# Patient Record
Sex: MALE | Race: WHITE | Employment: STUDENT
[De-identification: names, ages, dates, MRNs, and addresses within clinical notes are randomized per-mention and may not be internally consistent; named-entity substitution may affect disease eponyms.]

---
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
## 2019-10-03 ENCOUNTER — HEALTH MAINTENANCE LETTER (OUTPATIENT)
Age: 19
End: 2019-10-03

## 2019-10-29 ENCOUNTER — TELEPHONE (OUTPATIENT)
Dept: CARDIOLOGY | Facility: CLINIC | Age: 19
End: 2019-10-29

## 2019-10-29 DIAGNOSIS — I37.1 PULMONARY VALVE INSUFFICIENCY: Primary | ICD-10-CM

## 2019-10-29 NOTE — TELEPHONE ENCOUNTER
[facility] Call Center    Phone Message    May a detailed message be left on voicemail: yes    Reason for Call: Other: patient mother scheduled appt for pt and would like to know if an ECHO would need to be done before appt. No consent on file to communicate with mother, writer advised mother that call back would be to pt and not her. please advise     Action Taken: Message routed to:  Pediatric Clinics: Cardiology p [id number]

## 2019-10-29 NOTE — TELEPHONE ENCOUNTER
DSTLD message was sent to patient directly. Provided ECHO/cardiology appointment information. Informed patient this RN could not give mother medical or appointment information as MARIUM was not on file at this time but can be completed at future appointment.  Zuri Batista RN

## 2019-11-06 NOTE — TELEPHONE ENCOUNTER
Called patient and left a VM regarding MyChart message and upcoming appointment.   Zuri Batista RN

## 2020-01-02 ENCOUNTER — OFFICE VISIT (OUTPATIENT)
Dept: CARDIOLOGY | Facility: CLINIC | Age: 20
End: 2020-01-02
Attending: PEDIATRICS
Payer: COMMERCIAL

## 2020-01-02 VITALS
BODY MASS INDEX: 27.11 KG/M2 | DIASTOLIC BLOOD PRESSURE: 108 MMHG | RESPIRATION RATE: 18 BRPM | SYSTOLIC BLOOD PRESSURE: 151 MMHG | HEIGHT: 68 IN | OXYGEN SATURATION: 97 % | HEART RATE: 97 BPM | WEIGHT: 178.9 LBS

## 2020-01-02 DIAGNOSIS — I37.1 NONRHEUMATIC PULMONARY VALVE INSUFFICIENCY: Primary | ICD-10-CM

## 2020-01-02 DIAGNOSIS — I37.1 PULMONARY VALVE INSUFFICIENCY: ICD-10-CM

## 2020-01-02 PROCEDURE — 99203 OFFICE O/P NEW LOW 30 MIN: CPT | Mod: 25 | Performed by: PEDIATRICS

## 2020-01-02 PROCEDURE — 93303 ECHO TRANSTHORACIC: CPT | Performed by: PEDIATRICS

## 2020-01-02 PROCEDURE — 93325 DOPPLER ECHO COLOR FLOW MAPG: CPT | Performed by: PEDIATRICS

## 2020-01-02 PROCEDURE — 93320 DOPPLER ECHO COMPLETE: CPT | Performed by: PEDIATRICS

## 2020-01-02 ASSESSMENT — MIFFLIN-ST. JEOR: SCORE: 1786.5

## 2020-01-02 ASSESSMENT — PAIN SCALES - GENERAL: PAINLEVEL: NO PAIN (0)

## 2020-01-02 NOTE — PROGRESS NOTES
"                                               PEDS Cardiac Consult Letter  Date: 2020      Olman Brownfield, MD  [facility]  [address] [place] [address]      PATIENT: Rashawn Bautista  :          2000   CARLOS:          2020    Dear Dr. Paiz:    Rashawn is 19 years old and was seen at the [facility] on 2020.   He underwent balloon dilation valvuloplasty for pulmonary stenosis on 1998.  Since that time he has been completely asymptomatic.  He is in his third year at [employer] and considering a degree in computer science.  He does not do regular aerobic exercise, but works at night and [email or web address].  He feels that his exercise tolerance is normal.  He has not experienced any syncope or palpitations.  A paternal uncle had a myocardial infarction at age 36, and his father at age 42.    On physical examination his height was 1.72 m (5' 7.72\") and his weight was 81.1 kg (178 lb 14.4 oz).  His heart rate was 97  and respirations 18 per minute.  The blood pressure in his right arm was 151/108.  He was acyanotic, warm and well perfused. He was alert cooperative and in no distress.  His lungs were clear to auscultation without respiratory distress.  He had a regular rhythm with a grade 1/6 to 2/6 early systolic murmur at the upper left sternal border.  The second heart sound was physiologically split with a normal pulmonary component.   There was no organomegaly or abdominal tenderness.  Peripheral pulses were 2+ and equal in all extremities.  There was no clubbing or edema.    An echocardiogram performed today that I personally reviewed and explained to him and his family showed a peak velocity of 1.7 m/s in the main pulmonary artery and 2+ pulmonary insufficiency.  There was no significant right ventricular enlargement.    Rashawn has a good hemodynamic result from balloon dilation for pulmonary valve stenosis.  Because of the " residual pulmonary insufficiency, he does need to be followed for progressive right ventricular enlargement.  With family history of early coronary artery disease, I believe he should undergo a fasting lipid profile.  I would like to see him in follow-up in 2 years with an echocardiogram.    Thank you very much for your confidence in allowing me to participate in Rashawn's care.  If you have any questions or concerns, please don't hesitate to contact me.    Sincerely,      Davis Lezama M.D.   Pediatric Cardiology   [facility]  [phone]    Note: Chart documentation done in part with Dragon Voice Recognition software. Although reviewed after completion, some word and grammatical errors may remain.

## 2020-01-02 NOTE — LETTER
"2020      RE: Rashawn Juárezford  [address] [place] [address]                                                      PEDS Cardiac Consult Letter  Date: 2020      Olman Paiz MD  [facility]  [address] [place] [address]      PATIENT: Rashawn Bautista  :          2000   CARLOS:          2020    Dear Dr. Paiz:    Rashawn is 19 years old and was seen at the [facility] on 2020.   He underwent balloon dilation valvuloplasty for pulmonary stenosis on 1998.  Since that time he has been completely asymptomatic.  He is in his third year at [employer] and considering a degree in [employer] science.  He does not do regular aerobic exercise, but works at night and [employer].  He feels that his exercise tolerance is normal.  He has not experienced any syncope or palpitations.  A paternal uncle had a myocardial infarction at age 36, and his father at age 42.    On physical examination his height was 1.72 m (5' 7.72\") and his weight was 81.1 kg (178 lb 14.4 oz).  His heart rate was 97  and respirations 18 per minute.  The blood pressure in his right arm was 151/108.  He was acyanotic, warm and well perfused. He was alert cooperative and in no distress.  His lungs were clear to auscultation without respiratory distress.  He had a regular rhythm with a grade 1/6 to 2/6 early systolic murmur at the upper left sternal border.  The second heart sound was physiologically split with a normal pulmonary component.   There was no organomegaly or abdominal tenderness.  Peripheral pulses were 2+ and equal in all extremities.  There was no clubbing or edema.    An echocardiogram performed today that I personally reviewed and explained to him and his family showed a peak velocity of 1.7 m/s in the main pulmonary artery and 2+ pulmonary insufficiency.  There was no significant right ventricular enlargement.    Rashawn has a good hemodynamic result " from balloon dilation for pulmonary valve stenosis.  Because of the residual pulmonary insufficiency, he does need to be followed for progressive right ventricular enlargement.  With family history of early coronary artery disease, I believe he should undergo a fasting lipid profile.  I would like to see him in follow-up in 2 years with an echocardiogram.    Thank you very much for your confidence in allowing me to participate in Rashawn's care.  If you have any questions or concerns, please don't hesitate to contact me.    Sincerely,      Davis Lezama M.D.   Pediatric Cardiology   [facility]  [phone]    Note: Chart documentation done in part with Dragon Voice Recognition software. Although reviewed after completion, some word and grammatical errors may remain.     Davis Lezama MD

## 2020-01-02 NOTE — NURSING NOTE
"Rashawn Bautista's goals for this visit include:   Chief Complaint   Patient presents with     Heart Problem     [id number]       He requests these members of his care team be copied on today's visit information: Yes    PCP: Olman Paiz    Referring Provider:  Olman Paiz MD  [facility]  [address] [place] [address]    BP (!) 151/108 (BP Location: Right arm, Patient Position: Sitting, Cuff Size: Adult Large)   Pulse 97   Resp 18   Ht 1.72 m (5' 7.72\")   Wt 81.1 kg (178 lb 14.4 oz)   SpO2 97%   BMI 27.43 kg/m      Do you need any medication refills at today's visit? No    Henrry Ball CMA      "

## 2020-01-02 NOTE — PATIENT INSTRUCTIONS
Thank you for choosing [facility]. It was a pleasure to see you for your office visit today.     If you have any questions or scheduling needs during regular office hours, please call our [place] clinic: [phone]   If urgent concerns arise after hours, you can call [phone] and ask to speak to the pediatric specialist on call.   If you need to schedule Radiology tests, please call: [phone]  My Chart messages are for routine communication and questions and are usually answered within 48-72 hours. If you have an urgent concern or require sooner response, please call us.  Outside lab and imaging results should be faxed to [phone].  If you go to a lab outside of [facility] we will not automatically get those results. You will need to ask to have them faxed.       If you had any blood work, imaging or other tests completed today:  Normal test results will be mailed to your home address in a letter.  Abnormal results will be communicated to you via phone call/letter.  Please allow up to 1-2 weeks for processing and interpretation of most lab work.

## 2021-01-15 ENCOUNTER — HEALTH MAINTENANCE LETTER (OUTPATIENT)
Age: 21
End: 2021-01-15

## 2021-09-05 ENCOUNTER — HEALTH MAINTENANCE LETTER (OUTPATIENT)
Age: 21
End: 2021-09-05

## 2022-02-20 ENCOUNTER — HEALTH MAINTENANCE LETTER (OUTPATIENT)
Age: 22
End: 2022-02-20

## 2022-10-23 ENCOUNTER — HEALTH MAINTENANCE LETTER (OUTPATIENT)
Age: 22
End: 2022-10-23

## 2023-04-02 ENCOUNTER — HEALTH MAINTENANCE LETTER (OUTPATIENT)
Age: 23
End: 2023-04-02

## 2024-01-03 ENCOUNTER — TELEPHONE (OUTPATIENT)
Dept: PEDIATRIC CARDIOLOGY | Facility: CLINIC | Age: 24
End: 2024-01-03
Payer: COMMERCIAL

## 2024-01-03 NOTE — TELEPHONE ENCOUNTER
[facility] in [place] calling and has questions about a referral for patient.  Clarified that the referral is for General Cardiology and Cardiovascular surgery.    Dasha Glasgow RN

## 2024-06-02 ENCOUNTER — HEALTH MAINTENANCE LETTER (OUTPATIENT)
Age: 24
End: 2024-06-02